# Patient Record
Sex: FEMALE | Race: BLACK OR AFRICAN AMERICAN | NOT HISPANIC OR LATINO | URBAN - METROPOLITAN AREA
[De-identification: names, ages, dates, MRNs, and addresses within clinical notes are randomized per-mention and may not be internally consistent; named-entity substitution may affect disease eponyms.]

---

## 2017-09-01 ENCOUNTER — EMERGENCY (EMERGENCY)
Facility: HOSPITAL | Age: 22
LOS: 0 days | Discharge: HOME | End: 2017-09-02
Attending: EMERGENCY MEDICINE
Payer: COMMERCIAL

## 2017-09-01 VITALS
OXYGEN SATURATION: 100 % | TEMPERATURE: 101 F | WEIGHT: 115.08 LBS | HEART RATE: 100 BPM | HEIGHT: 63 IN | RESPIRATION RATE: 16 BRPM | SYSTOLIC BLOOD PRESSURE: 149 MMHG | DIASTOLIC BLOOD PRESSURE: 73 MMHG

## 2017-09-01 DIAGNOSIS — Z90.49 ACQUIRED ABSENCE OF OTHER SPECIFIED PARTS OF DIGESTIVE TRACT: Chronic | ICD-10-CM

## 2017-09-01 LAB
ALBUMIN SERPL ELPH-MCNC: 4.2 G/DL — SIGNIFICANT CHANGE UP (ref 3.3–5)
ALP SERPL-CCNC: 87 U/L — SIGNIFICANT CHANGE UP (ref 40–120)
ALT FLD-CCNC: 16 U/L — SIGNIFICANT CHANGE UP (ref 12–78)
ANION GAP SERPL CALC-SCNC: 13 MMOL/L — SIGNIFICANT CHANGE UP (ref 5–17)
APPEARANCE UR: CLEAR — SIGNIFICANT CHANGE UP
AST SERPL-CCNC: 23 U/L — SIGNIFICANT CHANGE UP (ref 15–37)
BASOPHILS # BLD AUTO: 0.1 K/UL — SIGNIFICANT CHANGE UP (ref 0–0.2)
BASOPHILS NFR BLD AUTO: 0.7 % — SIGNIFICANT CHANGE UP (ref 0–2)
BILIRUB SERPL-MCNC: 0.8 MG/DL — SIGNIFICANT CHANGE UP (ref 0.2–1.2)
BILIRUB UR-MCNC: NEGATIVE — SIGNIFICANT CHANGE UP
BUN SERPL-MCNC: 8 MG/DL — SIGNIFICANT CHANGE UP (ref 7–23)
CALCIUM SERPL-MCNC: 9.5 MG/DL — SIGNIFICANT CHANGE UP (ref 8.5–10.1)
CHLORIDE SERPL-SCNC: 100 MMOL/L — SIGNIFICANT CHANGE UP (ref 96–108)
CO2 SERPL-SCNC: 24 MMOL/L — SIGNIFICANT CHANGE UP (ref 22–31)
COLOR SPEC: YELLOW — SIGNIFICANT CHANGE UP
CREAT SERPL-MCNC: 0.88 MG/DL — SIGNIFICANT CHANGE UP (ref 0.5–1.3)
DIFF PNL FLD: NEGATIVE — SIGNIFICANT CHANGE UP
EOSINOPHIL # BLD AUTO: 0 K/UL — SIGNIFICANT CHANGE UP (ref 0–0.5)
EOSINOPHIL NFR BLD AUTO: 0.2 % — SIGNIFICANT CHANGE UP (ref 0–6)
GLUCOSE SERPL-MCNC: 86 MG/DL — SIGNIFICANT CHANGE UP (ref 70–99)
GLUCOSE UR QL: NEGATIVE MG/DL — SIGNIFICANT CHANGE UP
HCG SERPL-ACNC: <1 MIU/ML — SIGNIFICANT CHANGE UP
HCT VFR BLD CALC: 42 % — SIGNIFICANT CHANGE UP (ref 34.5–45)
HGB BLD-MCNC: 14.2 G/DL — SIGNIFICANT CHANGE UP (ref 11.5–15.5)
KETONES UR-MCNC: ABNORMAL
LEUKOCYTE ESTERASE UR-ACNC: NEGATIVE — SIGNIFICANT CHANGE UP
LIDOCAIN IGE QN: 134 U/L — SIGNIFICANT CHANGE UP (ref 73–393)
LYMPHOCYTES # BLD AUTO: 1.1 K/UL — SIGNIFICANT CHANGE UP (ref 1–3.3)
LYMPHOCYTES # BLD AUTO: 9.1 % — LOW (ref 13–44)
MAGNESIUM SERPL-MCNC: 2.1 MG/DL — SIGNIFICANT CHANGE UP (ref 1.6–2.6)
MCHC RBC-ENTMCNC: 29.1 PG — SIGNIFICANT CHANGE UP (ref 27–34)
MCHC RBC-ENTMCNC: 33.7 GM/DL — SIGNIFICANT CHANGE UP (ref 32–36)
MCV RBC AUTO: 86.3 FL — SIGNIFICANT CHANGE UP (ref 80–100)
MONOCYTES # BLD AUTO: 0.8 K/UL — SIGNIFICANT CHANGE UP (ref 0–0.9)
MONOCYTES NFR BLD AUTO: 6.5 % — SIGNIFICANT CHANGE UP (ref 2–14)
NEUTROPHILS # BLD AUTO: 9.7 K/UL — HIGH (ref 1.8–7.4)
NEUTROPHILS NFR BLD AUTO: 83.5 % — HIGH (ref 43–77)
NITRITE UR-MCNC: NEGATIVE — SIGNIFICANT CHANGE UP
PH UR: 7 — SIGNIFICANT CHANGE UP (ref 5–8)
PLATELET # BLD AUTO: 249 K/UL — SIGNIFICANT CHANGE UP (ref 150–400)
POTASSIUM SERPL-MCNC: 4 MMOL/L — SIGNIFICANT CHANGE UP (ref 3.5–5.3)
POTASSIUM SERPL-SCNC: 4 MMOL/L — SIGNIFICANT CHANGE UP (ref 3.5–5.3)
PROT SERPL-MCNC: 8 GM/DL — SIGNIFICANT CHANGE UP (ref 6–8.3)
PROT UR-MCNC: NEGATIVE MG/DL — SIGNIFICANT CHANGE UP
RBC # BLD: 4.87 M/UL — SIGNIFICANT CHANGE UP (ref 3.8–5.2)
RBC # FLD: 11.4 % — SIGNIFICANT CHANGE UP (ref 11–15)
SODIUM SERPL-SCNC: 137 MMOL/L — SIGNIFICANT CHANGE UP (ref 135–145)
SP GR SPEC: 1.01 — SIGNIFICANT CHANGE UP (ref 1.01–1.02)
UROBILINOGEN FLD QL: NEGATIVE MG/DL — SIGNIFICANT CHANGE UP
WBC # BLD: 11.7 K/UL — HIGH (ref 3.8–10.5)
WBC # FLD AUTO: 11.7 K/UL — HIGH (ref 3.8–10.5)

## 2017-09-01 PROCEDURE — 99285 EMERGENCY DEPT VISIT HI MDM: CPT

## 2017-09-01 RX ORDER — KETOROLAC TROMETHAMINE 30 MG/ML
30 SYRINGE (ML) INJECTION ONCE
Qty: 0 | Refills: 0 | Status: DISCONTINUED | OUTPATIENT
Start: 2017-09-01 | End: 2017-09-01

## 2017-09-01 RX ORDER — MORPHINE SULFATE 50 MG/1
2 CAPSULE, EXTENDED RELEASE ORAL ONCE
Qty: 0 | Refills: 0 | Status: DISCONTINUED | OUTPATIENT
Start: 2017-09-01 | End: 2017-09-01

## 2017-09-01 RX ORDER — IOHEXOL 300 MG/ML
30 INJECTION, SOLUTION INTRAVENOUS ONCE
Qty: 0 | Refills: 0 | Status: COMPLETED | OUTPATIENT
Start: 2017-09-01 | End: 2017-09-01

## 2017-09-01 RX ORDER — SODIUM CHLORIDE 9 MG/ML
1000 INJECTION INTRAMUSCULAR; INTRAVENOUS; SUBCUTANEOUS ONCE
Qty: 0 | Refills: 0 | Status: COMPLETED | OUTPATIENT
Start: 2017-09-01 | End: 2017-09-01

## 2017-09-01 RX ADMIN — Medication 30 MILLIGRAM(S): at 22:47

## 2017-09-01 RX ADMIN — MORPHINE SULFATE 2 MILLIGRAM(S): 50 CAPSULE, EXTENDED RELEASE ORAL at 23:22

## 2017-09-01 RX ADMIN — IOHEXOL 30 MILLILITER(S): 300 INJECTION, SOLUTION INTRAVENOUS at 23:21

## 2017-09-01 RX ADMIN — SODIUM CHLORIDE 1000 MILLILITER(S): 9 INJECTION INTRAMUSCULAR; INTRAVENOUS; SUBCUTANEOUS at 22:47

## 2017-09-01 RX ADMIN — MORPHINE SULFATE 2 MILLIGRAM(S): 50 CAPSULE, EXTENDED RELEASE ORAL at 22:47

## 2017-09-01 RX ADMIN — Medication 30 MILLIGRAM(S): at 23:22

## 2017-09-01 NOTE — ED PROVIDER NOTE - MEDICAL DECISION MAKING DETAILS
patient pw abd pain and diarrhea. suspect colitis vs enterocolitis. low liklihood of appendicitis or diverticulitis given she is non tender and has no fever. It seems unlikely a UTI or STI would cause diarrhea. patient pw abd pain and diarrhea. suspect colitis vs enterocolitis. low liklihood of appendicitis or diverticulitis given she is non tender and has no fever. It seems unlikely a UTI or STI would cause diarrhea. CT consistent with colitis. Will treat with cipro and flagyl and dc given she is tolerating po and non toxic.

## 2017-09-01 NOTE — ED ADULT TRIAGE NOTE - CHIEF COMPLAINT QUOTE
Pt complains of abdominal 9/10 sharp, nausea and diarrhea x this morning. Also complains of lightheadedness.

## 2017-09-01 NOTE — ED ADULT NURSE NOTE - OBJECTIVE STATEMENT
patient received, came here for abd pain started this morning with dizziness, nausea and chills. denies vomiting.

## 2017-09-01 NOTE — ED PROVIDER NOTE - OBJECTIVE STATEMENT
Pertinent PMH/PSH/FHx/SHx and Review of Systems contained within:  22f no med hx pw periumbilical abd pain assocaited with diarrhea x1 day. no fever, vomiting, nausea. no unusual foods or etoh. no dysuria, no vaginal discharge. uses condoms 100% with 1 partner  Fh and Sh not otherwise contributory  ROS otherwise negative

## 2017-09-02 VITALS
RESPIRATION RATE: 18 BRPM | SYSTOLIC BLOOD PRESSURE: 116 MMHG | DIASTOLIC BLOOD PRESSURE: 66 MMHG | TEMPERATURE: 99 F | HEART RATE: 85 BPM | OXYGEN SATURATION: 100 %

## 2017-09-02 DIAGNOSIS — R19.7 DIARRHEA, UNSPECIFIED: ICD-10-CM

## 2017-09-02 DIAGNOSIS — R51 HEADACHE: ICD-10-CM

## 2017-09-02 DIAGNOSIS — R53.1 WEAKNESS: ICD-10-CM

## 2017-09-02 PROCEDURE — 74177 CT ABD & PELVIS W/CONTRAST: CPT | Mod: 26

## 2017-09-02 RX ORDER — CIPROFLOXACIN LACTATE 400MG/40ML
500 VIAL (ML) INTRAVENOUS ONCE
Qty: 0 | Refills: 0 | Status: COMPLETED | OUTPATIENT
Start: 2017-09-02 | End: 2017-09-02

## 2017-09-02 RX ORDER — MOXIFLOXACIN HYDROCHLORIDE TABLETS, 400 MG 400 MG/1
1 TABLET, FILM COATED ORAL
Qty: 20 | Refills: 0 | OUTPATIENT
Start: 2017-09-02 | End: 2017-09-12

## 2017-09-02 RX ORDER — METRONIDAZOLE 500 MG
500 TABLET ORAL ONCE
Qty: 0 | Refills: 0 | Status: COMPLETED | OUTPATIENT
Start: 2017-09-02 | End: 2017-09-02

## 2017-09-02 RX ORDER — METRONIDAZOLE 500 MG
1 TABLET ORAL
Qty: 20 | Refills: 0 | OUTPATIENT
Start: 2017-09-02 | End: 2017-09-12

## 2017-09-02 RX ADMIN — Medication 500 MILLIGRAM(S): at 02:04

## 2017-09-03 LAB
CULTURE RESULTS: SIGNIFICANT CHANGE UP
SPECIMEN SOURCE: SIGNIFICANT CHANGE UP

## 2020-03-02 NOTE — ED ADULT NURSE NOTE - CAS ELECT INFOMATION PROVIDED
DC instructions No Residual Tumor Seen Histology Text: There were no malignant cells seen in the sections examined.

## 2023-10-28 ENCOUNTER — EMERGENCY (EMERGENCY)
Facility: HOSPITAL | Age: 28
LOS: 1 days | Discharge: HOME | End: 2023-10-28
Attending: EMERGENCY MEDICINE | Admitting: EMERGENCY MEDICINE
Payer: MEDICAID

## 2023-10-28 VITALS
OXYGEN SATURATION: 100 % | TEMPERATURE: 98 F | HEART RATE: 73 BPM | DIASTOLIC BLOOD PRESSURE: 86 MMHG | SYSTOLIC BLOOD PRESSURE: 124 MMHG | RESPIRATION RATE: 18 BRPM

## 2023-10-28 VITALS
RESPIRATION RATE: 18 BRPM | TEMPERATURE: 99 F | OXYGEN SATURATION: 100 % | HEART RATE: 70 BPM | DIASTOLIC BLOOD PRESSURE: 87 MMHG | SYSTOLIC BLOOD PRESSURE: 142 MMHG

## 2023-10-28 DIAGNOSIS — Z90.49 ACQUIRED ABSENCE OF OTHER SPECIFIED PARTS OF DIGESTIVE TRACT: Chronic | ICD-10-CM

## 2023-10-28 PROCEDURE — 70490 CT SOFT TISSUE NECK W/O DYE: CPT | Mod: 26,MA

## 2023-10-28 PROCEDURE — 99284 EMERGENCY DEPT VISIT MOD MDM: CPT

## 2023-10-28 NOTE — ED PROVIDER NOTE - CLINICAL SUMMARY MEDICAL DECISION MAKING FREE TEXT BOX
28yF w/no stated pmhx presenting to the ED with concern for fish bone in her throat after eating fish 4 nights ago on 10/24. Did have 3 days of subjective fever/chills, body aches which have since resolved, continues to feel something stuck in the left side of her throat when she swallows. On exam pt is well appearing, afebrile, no tonsillar swelling, no obvious FB in posterior oropharynx, no drooling, speaking in complete sentences. Plan: ENT consult, possible CT neck.

## 2023-10-28 NOTE — ED PROVIDER NOTE - NSFOLLOWUPINSTRUCTIONS_ED_ALL_ED_FT
Follow up with an ENT within 4-5 days, call the ENT clinic at [phone] to make an appointment  Follow up with your primary care doctor within 1 week  Return to the ER with any worsening or concerning symptoms, throat swelling, neck swelling, increased pain, fever/chills, difficulty swallowing, trouble breathing or any other concerns.

## 2023-10-28 NOTE — ED PROVIDER NOTE - PATIENT PORTAL LINK FT
You can access the FollowMyHealth Patient Portal offered by [facility] by registering at the following website: [email or web address]. By joining [facility]’s FollowMyHealth portal, you will also be able to view your health information using other applications (apps) compatible with our system.

## 2023-10-28 NOTE — ED PROVIDER NOTE - OBJECTIVE STATEMENT
28yF w/no stated pmhx presenting to the ED with concern for fish bone in her throat. Pt states she ate fish 4 nights ago on 10/24, felt sharp pain at the time and continues to feel there is something stuck in her throat on the left side when she swallows. She states 3 days ago she felt dizzy while at work, developed subjective fever/chills, body aches which has since resolved. Pt denies throat swelling, headache, dizziness, n/v/d, abd pain, cough, headache, cp, sob, known sick contacts or any other concerns.

## 2023-10-28 NOTE — ED PROVIDER NOTE - NSICDXNOFAMILYHX_GEN_ALL_ED
<-- Click to add NO pertinent Family History no abdominal pain, no bloating, no constipation, no diarrhea, no nausea and no vomiting.

## 2023-10-28 NOTE — CONSULT NOTE ADULT - SUBJECTIVE AND OBJECTIVE BOX
HPI:  Patient is a 28y Female with no significant PMH who p/w foreign body sensation in her left throat. Symptosm started 4 nights ago after eating a fish dish. She felt a sharp pain at that tiem which has persisted. It is worse when swallowing. Still tolerating PO. No drooling or shortness of breath. No limited neck ROM or neck stiffness. Some pain in the area when she turns her head. Reports subjective fevers/chills and body aches but these have resolved. No chest pain or neck swelling. Denies sick contacts.    Physical Exam  T(C): 37.2 (10-28-23 @ 13:05), Max: 37.2 (10-28-23 @ 13:05)  HR: 70 (10-28-23 @ 13:05) (70 - 70)  BP: 142/87 (10-28-23 @ 13:05) (142/87 - 142/87)  RR: 18 (10-28-23 @ 13:05) (18 - 18)  SpO2: 100% (10-28-23 @ 13:05) (100% - 100%)  General: NAD, A+Ox3  Resp: No respiratory distress, stridor, or stertor  Voice quality: normal  Face:  Symmetric without masses or lesions  OU: EOMI  AD: Pinna wnl  AS: Pinna wnl  Nose: nasal cavity clear bilaterally, inferior turbinates normal, mucosa normal without crusting or bleeding  OC/OP: tongue normal, floor of mouth wnl, no masses or lesions, 2+ cryptic tonsils, no foreign bodies visualzied. No mucosal injuries or edema.   Neck: soft/flat, no LAD  CNII-XII intact    LARYNGOSCOPY EXAM:   Verbal consent was obtained from patient prior to procedure.  Indication: r/o foreign body impaction  Flexible laryngoscopy was performed and revealed the following:    Nasopharynx had no mass or exudate.    Base of tongue was symmetric and not enlarged.    Vallecula was clear    Epiglottis, both aryepiglottic folds and both false vocal folds were normal    Redundant arytenoid tissue, mild/mod interarytenoid and post cricoid edema.      True vocal folds were fully mobile and without lesions.     No foreign bodies visualized. No mucosal injuries, clots, or active bleeding.  The patient tolerated the procedure well.    A/P: 28y Female p/w foreign body sensation in left throat. Examination of mouth and flexible laryngoscopic exam did not reveal obvious foreign bodies or evidence of mucosal injury. However, foreign body could be submucosal or in posterior tonsillar fossa out of sight. Some arytenoid edema to suggest laryngopharyngeal reflux or viral syndrome.     Recommendations  - CT neck without contrast to r/o radiopaque foreign body  - if negative, can be discharged with return precautions (fever, chest pain, neck swelling, or respiratory distress develop)  - patient can be advised to take famotidine (pepcid) over the counter and follow up with ENT in 4 weeks.    --------------------------------------------------------------  Thank you for the consult,    Department of Otolaryngology - Head and Neck Surgery  Peds Page #[phone]  Adult Page #[phone]  --------------------------------------------------------------- HPI:  Patient is a 28y Female with no significant PMH who p/w foreign body sensation in her left throat. Symptosm started 4 nights ago after eating a fish dish. She felt a sharp pain at that tiem which has persisted. It is worse when swallowing. Still tolerating PO. No drooling or shortness of breath. No limited neck ROM or neck stiffness. Some pain in the area when she turns her head. Reports subjective fevers/chills and body aches but these have resolved. No chest pain or neck swelling. Denies sick contacts.    Physical Exam  T(C): 37.2 (10-28-23 @ 13:05), Max: 37.2 (10-28-23 @ 13:05)  HR: 70 (10-28-23 @ 13:05) (70 - 70)  BP: 142/87 (10-28-23 @ 13:05) (142/87 - 142/87)  RR: 18 (10-28-23 @ 13:05) (18 - 18)  SpO2: 100% (10-28-23 @ 13:05) (100% - 100%)  General: NAD, A+Ox3  Resp: No respiratory distress, stridor, or stertor  Voice quality: normal  Face:  Symmetric without masses or lesions  OU: EOMI  AD: Pinna wnl  AS: Pinna wnl  Nose: nasal cavity clear bilaterally, inferior turbinates normal, mucosa normal without crusting or bleeding  OC/OP: tongue normal, floor of mouth wnl, no masses or lesions, 2+ cryptic tonsils, no foreign bodies visualzied. No mucosal injuries or edema.   Neck: soft/flat, no LAD  CNII-XII intact    LARYNGOSCOPY EXAM:   Verbal consent was obtained from patient prior to procedure.  Indication: r/o foreign body impaction  Flexible laryngoscopy was performed and revealed the following:    Nasopharynx had no mass or exudate.    Base of tongue was symmetric and not enlarged.    Vallecula was clear    Epiglottis, both aryepiglottic folds and both false vocal folds were normal    Redundant arytenoid tissue, mild/mod interarytenoid and post cricoid edema.      True vocal folds were fully mobile and without lesions.     No foreign bodies visualized. No mucosal injuries, clots, or active bleeding.  The patient tolerated the procedure well.    A/P: 28y Female p/w foreign body sensation in left throat. Examination of mouth and flexible laryngoscopic exam did not reveal obvious foreign bodies or evidence of mucosal injury. However, foreign body could be submucosal or in posterior tonsillar fossa out of sight. Some arytenoid edema to suggest laryngopharyngeal reflux or viral syndrome.     Recommendations  - CT neck without contrast to r/o radiopaque foreign body  - if negative, can be discharged with return precautions (fever, chest pain, neck swelling, or respiratory distress develop)  - patient can be advised to take famotidine (pepcid) over the counter and follow up with ENT in 4 weeks.    UPDATE:  CT neck shows possible curvilinear radiopaque object in BOT/vallecular. Patient re-scoped. Difficult to assess this location due to BOT fullness and secretions but thorough exam did not show bony fragment.  Discussed with patient that there very well could be a fish bone in this area that we cannot see either due to inadequate visualization or that it is submucosal. Alternatively, symptoms could be reflux or URI related. Discussed that next step would be examination in the OR which was offered. Patient would rather wait several days to see if symptoms resolve and will follow up with ENT outpatient. Discussed return precautions - fever, worsening neck pain/swelling, limited neck ROM, chest pain, crepitus, nausea/vomiting, hemoptysis, reduced swallow, drooling, respiratory distress    --------------------------------------------------------------  Thank you for the consult,    Department of Otolaryngology - Head and Neck Surgery  Peds Page #[id number]  Adult Page #[phone]  --------------------------------------------------------------- HPI:  Patient is a 28y Female with no significant PMH who p/w foreign body sensation in her left throat. Symptosm started 4 nights ago after eating a fish dish. She felt a sharp pain at that tiem which has persisted. It is worse when swallowing. Still tolerating PO. No drooling or shortness of breath. No limited neck ROM or neck stiffness. Some pain in the area when she turns her head. Reports subjective fevers/chills and body aches but these have resolved. No chest pain or neck swelling. Denies sick contacts.    Physical Exam  T(C): 37.2 (10-28-23 @ 13:05), Max: 37.2 (10-28-23 @ 13:05)  HR: 70 (10-28-23 @ 13:05) (70 - 70)  BP: 142/87 (10-28-23 @ 13:05) (142/87 - 142/87)  RR: 18 (10-28-23 @ 13:05) (18 - 18)  SpO2: 100% (10-28-23 @ 13:05) (100% - 100%)  General: NAD, A+Ox3  Resp: No respiratory distress, stridor, or stertor  Voice quality: normal  Face:  Symmetric without masses or lesions  OU: EOMI  AD: Pinna wnl  AS: Pinna wnl  Nose: nasal cavity clear bilaterally, inferior turbinates normal, mucosa normal without crusting or bleeding  OC/OP: tongue normal, floor of mouth wnl, no masses or lesions, 2+ cryptic tonsils, no foreign bodies visualzied. No mucosal injuries or edema.   Neck: soft/flat, no LAD  CNII-XII intact    LARYNGOSCOPY EXAM:   Verbal consent was obtained from patient prior to procedure.  Indication: r/o foreign body impaction  Flexible laryngoscopy was performed and revealed the following:    Nasopharynx had no mass or exudate.    Base of tongue was symmetric and not enlarged.    Vallecula was clear    Epiglottis, both aryepiglottic folds and both false vocal folds were normal    Redundant arytenoid tissue, mild/mod interarytenoid and post cricoid edema.      True vocal folds were fully mobile and without lesions.     No foreign bodies visualized. No mucosal injuries, clots, or active bleeding.  The patient tolerated the procedure well.    A/P: 28y Female p/w foreign body sensation in left throat. Examination of mouth and flexible laryngoscopic exam did not reveal obvious foreign bodies or evidence of mucosal injury. However, foreign body could be submucosal or in posterior tonsillar fossa out of sight. Some arytenoid edema to suggest laryngopharyngeal reflux or viral syndrome.     Recommendations  - CT neck without contrast to r/o radiopaque foreign body  - if negative, can be discharged with return precautions (fever, chest pain, neck swelling, or respiratory distress develop)  - patient can be advised to take famotidine (pepcid) over the counter and follow up with ENT in 4 weeks.    UPDATE:  CT neck shows possible curvilinear radiopaque object in at epiglottis base/vallecula. Patient re-scoped. Difficult to assess this location due to BOT fullness and secretions but thorough exam did not show bony fragment.  Discussed with patient that there very well could be a fish bone in this area that we cannot see either due to inadequate visualization or because it is submucosal. Alternatively, symptoms could be reflux or URI related. Discussed that next step would be examination in the OR which was offered. Patient would rather wait several days to see if symptoms resolve and will follow up with ENT outpatient. Discussed return precautions - fever, worsening neck pain/swelling, limited neck ROM, chest pain, crepitus, nausea/vomiting, hemoptysis, reduced swallow, drooling, respiratory distress    --------------------------------------------------------------  Thank you for the consult,    Department of Otolaryngology - Head and Neck Surgery  Peds Page #[id number]  Adult Page #[phone]  ---------------------------------------------------------------

## 2023-10-28 NOTE — ED ADULT TRIAGE NOTE - CHIEF COMPLAINT QUOTE
Pt reporting to the ED for possible fish bone stuck in throat since Tuesday night. Reports throat pain. No drooling or stridor noted, speaking in full sentences, spo2 100% on room air.

## 2023-10-28 NOTE — ED PROVIDER NOTE - NS ED ATTENDING STATEMENT MOD
This was a shared visit with the EVANGELIST. I reviewed and verified the documentation and independently performed the documented:

## 2023-10-28 NOTE — ED PROVIDER NOTE - ATTENDING APP SHARED VISIT CONTRIBUTION OF CARE
Mainor: I have seen and examined the patient face to face, have reviewed and addended the HPI, PE and a/p as necessary.    29 yo F with no PMH a/w fish bone in throat 4 days ago after eating red snapper.  Pt reports she felt pain on L side of throat and reports ongoing pain since then.  Reports 3 days ago she felt dizzy while at work, developed subjective fever/chills, body aches which has since resolved. No fever, No photophobia/eye pain/changes in vision, No ear pain, No chest pain/palpitations, no SOB/cough/wheeze/stridor, No abd pain, No N/V/D, no dysuria/frequency/discharge, No neck/back pain, no rash, no changes in neurological status/function.     GEN - NAD; well appearing; A+O x3; non-toxic appearing  HEAD: NCAT; EYES/NOSE: PERRLA, EOMI, no discharge; THROAT: Oral cavity and pharynx normal. No inflammation, swelling, exudate, or lesions.   CARD -s1s2, RRR, no M,G,R;   PULM - CTA b/l, symmetric breath sounds;   ABD -  +BS, ND, NT, soft, no guarding, no rebound, no masses;   EXT - symmetric pulses, 2+ dp, capillary refill < 2 seconds, no cyanosis, no edema;   NEURO - no focal neuro deficits, no slurred speech    R/o foreign body, will have ENT scope, if negative for foreign body will check CT a/p to further evaluate.

## 2023-10-28 NOTE — ED PROVIDER NOTE - PROGRESS NOTE DETAILS
YEN Rojas: Paged ENT for consult YEN Rojas: Spoke with ENT will see pt in the ED YEN Rojas: No obvious FB on initial scope by ENT, will proceed with CT YEN Rojas: CT with questionable fishbone, spoke with ENT will reassess. YEN Rojas: Pt seen by ENT, had repeat scope, unable to completely visualize area of possible fishbone, pt offered OR for complete evaluation and declined, prefer to f/u outpatient. Discussed strict return precautions with pt, she will return to the ER with any worsening or concerning symptoms, throat or neck swelling, difficulty swallowing, fever/chills, shortness of breath or any other concerns.

## 2024-04-12 ENCOUNTER — EMERGENCY (EMERGENCY)
Facility: HOSPITAL | Age: 29
LOS: 1 days | Discharge: HOME | End: 2024-04-12
Attending: EMERGENCY MEDICINE | Admitting: EMERGENCY MEDICINE
Payer: MEDICAID

## 2024-04-12 ENCOUNTER — TRANSCRIPTION ENCOUNTER (OUTPATIENT)
Age: 29
End: 2024-04-12

## 2024-04-12 VITALS
HEART RATE: 89 BPM | RESPIRATION RATE: 18 BRPM | DIASTOLIC BLOOD PRESSURE: 86 MMHG | TEMPERATURE: 99 F | OXYGEN SATURATION: 99 % | SYSTOLIC BLOOD PRESSURE: 130 MMHG

## 2024-04-12 VITALS
HEART RATE: 72 BPM | DIASTOLIC BLOOD PRESSURE: 71 MMHG | TEMPERATURE: 98 F | OXYGEN SATURATION: 100 % | SYSTOLIC BLOOD PRESSURE: 114 MMHG | RESPIRATION RATE: 16 BRPM

## 2024-04-12 DIAGNOSIS — Z90.49 ACQUIRED ABSENCE OF OTHER SPECIFIED PARTS OF DIGESTIVE TRACT: Chronic | ICD-10-CM

## 2024-04-12 LAB
ALBUMIN SERPL ELPH-MCNC: 4.4 G/DL — SIGNIFICANT CHANGE UP (ref 3.3–5)
ALP SERPL-CCNC: 74 U/L — SIGNIFICANT CHANGE UP (ref 40–120)
ALT FLD-CCNC: 11 U/L — SIGNIFICANT CHANGE UP (ref 4–33)
ANION GAP SERPL CALC-SCNC: 10 MMOL/L — SIGNIFICANT CHANGE UP (ref 7–14)
APPEARANCE UR: ABNORMAL
AST SERPL-CCNC: 16 U/L — SIGNIFICANT CHANGE UP (ref 4–32)
BACTERIA # UR AUTO: ABNORMAL /HPF
BASOPHILS # BLD AUTO: 0.05 K/UL — SIGNIFICANT CHANGE UP (ref 0–0.2)
BASOPHILS NFR BLD AUTO: 0.6 % — SIGNIFICANT CHANGE UP (ref 0–2)
BILIRUB SERPL-MCNC: 0.2 MG/DL — SIGNIFICANT CHANGE UP (ref 0.2–1.2)
BILIRUB UR-MCNC: NEGATIVE — SIGNIFICANT CHANGE UP
BUN SERPL-MCNC: 10 MG/DL — SIGNIFICANT CHANGE UP (ref 7–23)
CALCIUM SERPL-MCNC: 9.8 MG/DL — SIGNIFICANT CHANGE UP (ref 8.4–10.5)
CAST: 0 /LPF — SIGNIFICANT CHANGE UP (ref 0–4)
CHLORIDE SERPL-SCNC: 106 MMOL/L — SIGNIFICANT CHANGE UP (ref 98–107)
CO2 SERPL-SCNC: 24 MMOL/L — SIGNIFICANT CHANGE UP (ref 22–31)
COLOR SPEC: YELLOW — SIGNIFICANT CHANGE UP
CREAT SERPL-MCNC: 0.84 MG/DL — SIGNIFICANT CHANGE UP (ref 0.5–1.3)
DIFF PNL FLD: NEGATIVE — SIGNIFICANT CHANGE UP
EGFR: 98 ML/MIN/1.73M2 — SIGNIFICANT CHANGE UP
EOSINOPHIL # BLD AUTO: 0.48 K/UL — SIGNIFICANT CHANGE UP (ref 0–0.5)
EOSINOPHIL NFR BLD AUTO: 5.6 % — SIGNIFICANT CHANGE UP (ref 0–6)
GLUCOSE SERPL-MCNC: 86 MG/DL — SIGNIFICANT CHANGE UP (ref 70–99)
GLUCOSE UR QL: NEGATIVE MG/DL — SIGNIFICANT CHANGE UP
HCG SERPL-ACNC: <1 MIU/ML — SIGNIFICANT CHANGE UP
HCT VFR BLD CALC: 40.4 % — SIGNIFICANT CHANGE UP (ref 34.5–45)
HGB BLD-MCNC: 13.4 G/DL — SIGNIFICANT CHANGE UP (ref 11.5–15.5)
IANC: 4.98 K/UL — SIGNIFICANT CHANGE UP (ref 1.8–7.4)
IMM GRANULOCYTES NFR BLD AUTO: 0.2 % — SIGNIFICANT CHANGE UP (ref 0–0.9)
KETONES UR-MCNC: ABNORMAL MG/DL
LEUKOCYTE ESTERASE UR-ACNC: ABNORMAL
LIDOCAIN IGE QN: 32 U/L — SIGNIFICANT CHANGE UP (ref 7–60)
LYMPHOCYTES # BLD AUTO: 2.53 K/UL — SIGNIFICANT CHANGE UP (ref 1–3.3)
LYMPHOCYTES # BLD AUTO: 29.7 % — SIGNIFICANT CHANGE UP (ref 13–44)
MCHC RBC-ENTMCNC: 28.5 PG — SIGNIFICANT CHANGE UP (ref 27–34)
MCHC RBC-ENTMCNC: 33.2 GM/DL — SIGNIFICANT CHANGE UP (ref 32–36)
MCV RBC AUTO: 86 FL — SIGNIFICANT CHANGE UP (ref 80–100)
MONOCYTES # BLD AUTO: 0.47 K/UL — SIGNIFICANT CHANGE UP (ref 0–0.9)
MONOCYTES NFR BLD AUTO: 5.5 % — SIGNIFICANT CHANGE UP (ref 2–14)
NEUTROPHILS # BLD AUTO: 4.98 K/UL — SIGNIFICANT CHANGE UP (ref 1.8–7.4)
NEUTROPHILS NFR BLD AUTO: 58.4 % — SIGNIFICANT CHANGE UP (ref 43–77)
NITRITE UR-MCNC: NEGATIVE — SIGNIFICANT CHANGE UP
NRBC # BLD: 0 /100 WBCS — SIGNIFICANT CHANGE UP (ref 0–0)
NRBC # FLD: 0 K/UL — SIGNIFICANT CHANGE UP (ref 0–0)
PH UR: 5.5 — SIGNIFICANT CHANGE UP (ref 5–8)
PLATELET # BLD AUTO: 325 K/UL — SIGNIFICANT CHANGE UP (ref 150–400)
POTASSIUM SERPL-MCNC: 4.5 MMOL/L — SIGNIFICANT CHANGE UP (ref 3.5–5.3)
POTASSIUM SERPL-SCNC: 4.5 MMOL/L — SIGNIFICANT CHANGE UP (ref 3.5–5.3)
PROT SERPL-MCNC: 7.4 G/DL — SIGNIFICANT CHANGE UP (ref 6–8.3)
PROT UR-MCNC: SIGNIFICANT CHANGE UP MG/DL
RBC # BLD: 4.7 M/UL — SIGNIFICANT CHANGE UP (ref 3.8–5.2)
RBC # FLD: 11.9 % — SIGNIFICANT CHANGE UP (ref 10.3–14.5)
RBC CASTS # UR COMP ASSIST: 6 /HPF — HIGH (ref 0–4)
SODIUM SERPL-SCNC: 140 MMOL/L — SIGNIFICANT CHANGE UP (ref 135–145)
SP GR SPEC: 1.03 — HIGH (ref 1–1.03)
SQUAMOUS # UR AUTO: 14 /HPF — HIGH (ref 0–5)
UROBILINOGEN FLD QL: 1 MG/DL — SIGNIFICANT CHANGE UP (ref 0.2–1)
WBC # BLD: 8.53 K/UL — SIGNIFICANT CHANGE UP (ref 3.8–10.5)
WBC # FLD AUTO: 8.53 K/UL — SIGNIFICANT CHANGE UP (ref 3.8–10.5)
WBC UR QL: 4 /HPF — SIGNIFICANT CHANGE UP (ref 0–5)

## 2024-04-12 PROCEDURE — 93975 VASCULAR STUDY: CPT | Mod: 26

## 2024-04-12 PROCEDURE — 99285 EMERGENCY DEPT VISIT HI MDM: CPT

## 2024-04-12 PROCEDURE — 76830 TRANSVAGINAL US NON-OB: CPT | Mod: 26

## 2024-04-12 PROCEDURE — 74177 CT ABD & PELVIS W/CONTRAST: CPT | Mod: 26,MC

## 2024-04-12 RX ORDER — AZITHROMYCIN 500 MG/1
1 TABLET, FILM COATED ORAL
Qty: 4 | Refills: 0
Start: 2024-04-12 | End: 2024-04-15

## 2024-04-12 RX ORDER — AMOXICILLIN 250 MG/5ML
2 SUSPENSION, RECONSTITUTED, ORAL (ML) ORAL
Qty: 42 | Refills: 0
Start: 2024-04-12 | End: 2024-04-18

## 2024-04-12 RX ORDER — ACETAMINOPHEN 500 MG
1000 TABLET ORAL ONCE
Refills: 0 | Status: COMPLETED | OUTPATIENT
Start: 2024-04-12 | End: 2024-04-12

## 2024-04-12 RX ORDER — AMOXICILLIN 250 MG/5ML
1000 SUSPENSION, RECONSTITUTED, ORAL (ML) ORAL ONCE
Refills: 0 | Status: COMPLETED | OUTPATIENT
Start: 2024-04-12 | End: 2024-04-12

## 2024-04-12 RX ORDER — AZITHROMYCIN 500 MG/1
500 TABLET, FILM COATED ORAL ONCE
Refills: 0 | Status: COMPLETED | OUTPATIENT
Start: 2024-04-12 | End: 2024-04-12

## 2024-04-12 RX ADMIN — Medication 400 MILLIGRAM(S): at 17:46

## 2024-04-12 RX ADMIN — Medication 1000 MILLIGRAM(S): at 23:30

## 2024-04-12 RX ADMIN — AZITHROMYCIN 500 MILLIGRAM(S): 500 TABLET, FILM COATED ORAL at 23:30

## 2024-04-12 NOTE — ED PROVIDER NOTE - PROGRESS NOTE DETAILS
BEE Antoine PGY1- US negative, will obtain CT abd/pelvis to eval for other causes of pain Attending MD Rose.  Pt signed out to me in stable condition pending CTr, dispo per findings, 29 yo fem no sig pmhx presented with diarrhea/vomiting and abdominal pain waiting on CTAP at time of signout.  Shortly after signout imaging c/w PNA.  Planned tx, return and f/u precautions. YEN Cardoso: Patient received at signout pending CT CT reviewed no acute intra-abdominal pathology but there is notice of a focal right lower lobe patchy groundglass opacity.  Upon reassessment patient states that she has been having URI symptoms with a "deep cough" so she is agreeable to treatment for pneumonia.  Remainder of labs reviewed all within normal limits.  Patient and family number at bedside counseled regarding signs/symptoms warranting return and all questions were answered they verbalized understanding.

## 2024-04-12 NOTE — ED ADULT NURSE NOTE - OBJECTIVE STATEMENT
Pt A&OX4 c/o Left sided abdominal pain; has been intermittent for 2 days; no diarrhea; no vaginal bleeding; was constipated today; Hx of appendectomy; 20G IV placed in Left AC, labs drawn and sent, medications given as per MD.  Pt to ultrasound.

## 2024-04-12 NOTE — ED PROVIDER NOTE - CLINICAL SUMMARY MEDICAL DECISION MAKING FREE TEXT BOX
BEE Antoine PGY1- patient here with 4 days of constant dull LLQ pain, with some chills but no fever, no other GI symptoms. Exam notable for LLQ tenderness. Will obtain US to eval for torsion, ectopic pregnancy; if negative obtain CT. Labs, analgesics, UA. Dispo pending workup.

## 2024-04-12 NOTE — ED PROVIDER NOTE - OBJECTIVE STATEMENT
The patient is a 29 y/o F with no chronic medical problems, prior appendectomy, presenting with 4 day hx of constant dull LLQ pain. Had some chills last night, but believes these may have been associated with recent URI symptoms. No fever, nausea, vomiting, diarrhea, chest pain, dyspnea, vaginal bleeding or discharge, dysuria, or any other symptoms. Tylenol did provide some relief of pain yesterday, no meds taken for pain today.

## 2024-04-12 NOTE — ED ADULT NURSE NOTE - NSFALLUNIVINTERV_ED_ALL_ED
Bed/Stretcher in lowest position, wheels locked, appropriate side rails in place/Call bell, personal items and telephone in reach/Instruct patient to call for assistance before getting out of bed/chair/stretcher/Non-slip footwear applied when patient is off stretcher/[place] to call system/Physically safe environment - no spills, clutter or unnecessary equipment/Purposeful proactive rounding/Room/bathroom lighting operational, light cord in reach

## 2024-04-12 NOTE — ED PROVIDER NOTE - NSFOLLOWUPINSTRUCTIONS_ED_ALL_ED_FT
- Lab and imaging results, if performed, were discussed with you along with your discharge diagnosis    - Follow up with your doctor in 1 week - bring copies of your results if you were given. If you do not have a primary doctor, please call [phone] to find one convenient for you    - Return to the ED for any new, worsening, or concerning symptoms to you    - Continue all prescribed medications    - Take ibuprofen/tylenol as directed as needed for pain    - Rest and keep yourself hydrated with fluids - Lab and imaging results, if performed, were discussed with you along with your discharge diagnosis    - Follow up with your doctor in 1 week - bring copies of your results if you were given. If you do not have a primary doctor, please call [phone] to find one convenient for you    - Return to the ED for any new, worsening, or concerning symptoms to you    - Continue all prescribed medications    - Take ibuprofen/tylenol as directed as needed for pain    - Rest and keep yourself hydrated with fluids    Pneumonia    Pneumonia is an infection of the lungs. Pneumonia may be caused by bacteria, viruses, or funguses. Symptoms include coughing, fever, chest pain when breathing deeply or coughing, shortness of breath, fatigue, or muscle aches. Pneumonia can be diagnosed with a medical history and physical exam, as well as other tests which may include a chest X-ray. If you were prescribed an antibiotic medicine, take it as told by your health care provider and do not stop taking the antibiotic even if you start to feel better. Do not use tobacco products, including cigarettes, chewing tobacco, and e-cigarettes.    SEEK IMMEDIATE MEDICAL CARE IF YOU HAVE ANY OF THE FOLLOWING SYMPTOMS: worsening shortness of breath, worsening chest pain, coughing up blood, change in mental status, lightheadedness/dizziness.

## 2024-04-12 NOTE — ED PROVIDER NOTE - ATTENDING CONTRIBUTION TO CARE
28F with h/o appendectomy p/w LLQ pain x 4 days. Pt also reports URI symptoms, associated with chills. Denies n/v/c/d, fever, decreased PO intake, dysuria/hematuria, abnormal vaginal bleeding/discharge. On exam, there is LLQ TTP without distension, rigidity, or guarding. No CVA TTP.    DDX/PLAN: Ddx includes ovarian torsion, incidental pregnancy, UTI/cystitis, colitis. Will obtain labs, urine studies, and pelvic US. Consider CTAP if pelvic US is unremarkable. Will treat pain and reassess.

## 2024-04-12 NOTE — ED PROVIDER NOTE - PHYSICAL EXAMINATION
General: no acute distress  Psych: mood appropriate  Head: normocephalic; atraumatic  Eyes: conjunctivae clear bilaterally, sclerae anicteric  ENT: no nasal flaring, patent nares  Cardio: regular rate and rhythm; normal heart sounds  Resp: clear to auscultation bilaterally  GI: abdomen soft, nondistended; LLQ tenderness  : no CVA tenderness; pelvic exam supervised by Dr. Richardson, notable for scant amount of physiologic discharge, no bleeding, no CMT  Neuro: A&Ox3  Skin: no rashes or bruising noted  MSK: normal movement of extremities  Lymph/Vasc: no LE edema

## 2024-04-14 LAB
CULTURE RESULTS: SIGNIFICANT CHANGE UP
SPECIMEN SOURCE: SIGNIFICANT CHANGE UP